# Patient Record
Sex: FEMALE | Race: WHITE | NOT HISPANIC OR LATINO | Employment: PART TIME | URBAN - METROPOLITAN AREA
[De-identification: names, ages, dates, MRNs, and addresses within clinical notes are randomized per-mention and may not be internally consistent; named-entity substitution may affect disease eponyms.]

---
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
## 2024-05-01 ENCOUNTER — HOSPITAL ENCOUNTER (EMERGENCY)
Facility: CLINIC | Age: 19
Discharge: HOME | End: 2024-05-02
Attending: EMERGENCY MEDICINE | Admitting: EMERGENCY MEDICINE
Payer: COMMERCIAL

## 2024-05-01 VITALS
BODY MASS INDEX: 20.77 KG/M2 | HEIGHT: 61 IN | OXYGEN SATURATION: 96 % | TEMPERATURE: 98 F | RESPIRATION RATE: 16 BRPM | HEART RATE: 88 BPM | DIASTOLIC BLOOD PRESSURE: 50 MMHG | WEIGHT: 110 LBS | SYSTOLIC BLOOD PRESSURE: 115 MMHG

## 2024-05-01 DIAGNOSIS — S61.216A LACERATION OF RIGHT LITTLE FINGER WITHOUT FOREIGN BODY WITHOUT DAMAGE TO NAIL, INITIAL ENCOUNTER: ICD-10-CM

## 2024-05-01 PROCEDURE — 99284 EMERGENCY DEPT VISIT MOD MDM: CPT

## 2024-05-01 PROCEDURE — 12001 RPR S/N/AX/GEN/TRNK 2.5CM/<: CPT

## 2024-05-01 ASSESSMENT — COLUMBIA-SUICIDE SEVERITY RATING SCALE - C-SSRS
6. HAVE YOU EVER DONE ANYTHING, STARTED TO DO ANYTHING, OR PREPARED TO DO ANYTHING TO END YOUR LIFE?: NO
2. HAVE YOU ACTUALLY HAD ANY THOUGHTS OF KILLING YOURSELF IN THE PAST MONTH?: NO
1. IN THE PAST MONTH, HAVE YOU WISHED YOU WERE DEAD OR WISHED YOU COULD GO TO SLEEP AND NOT WAKE UP?: NO

## 2024-05-01 ASSESSMENT — ACTIVITIES OF DAILY LIVING (ADL): ADLS_ACUITY_SCORE: 33

## 2024-05-02 PROCEDURE — 250N000009 HC RX 250: Performed by: EMERGENCY MEDICINE

## 2024-05-02 RX ADMIN — Medication 3 ML: at 01:06

## 2024-05-02 ASSESSMENT — ACTIVITIES OF DAILY LIVING (ADL): ADLS_ACUITY_SCORE: 33

## 2024-05-02 NOTE — ED TRIAGE NOTES
Patient states cut finger on a soup can at work.  Laceration to right pinky finger.      Triage Assessment (Adult)       Row Name 05/01/24 7740          Triage Assessment    Airway WDL WDL        Respiratory WDL    Respiratory WDL WDL        Cardiac WDL    Cardiac WDL WDL        Peripheral/Neurovascular WDL    Peripheral Neurovascular WDL WDL        Cognitive/Neuro/Behavioral WDL    Cognitive/Neuro/Behavioral WDL WDL

## 2024-05-02 NOTE — ED PROVIDER NOTES
"History   Chief Complaint:  Finger laceration    HPI     Nidhi Rain is a 19 year old female who presents for evaluation of a finger laceration that she sustained at work at [employer] to her right small finger just prior to arrival.  She is right-hand dominant.  The cut occurred on a soup can.  No other injuries.  Bleeding controlled prior to arrival.  Patient volunteers a strong phobia of needles and would prefer that the wound to be glued    Review of External Notes: I personally reviewed prior records, patient was seen in clinic recently to receive testosterone for gender dysphoria.    Medications:    No daily medications    Past Medical History:    Gender dysphoria    Physical Exam   Patient Vitals for the past 24 hrs:   BP Temp Temp src Pulse Resp SpO2 Height Weight   05/01/24 2225 115/50 98  F (36.7  C) Temporal 88 16 96 % 1.549 m (5' 1\") 49.9 kg (110 lb)      Physical Exam  General: Nontoxic-appearing, sitting upright in room 15  CV: rate as above, no active bleeding, R small finger well perfused  Resp: normal respiratory effort  MSK: no surrounding bony tenderness, good ROM all joints in R small finger  Skin: 1.0 cm linear laceration to fingerpad of R small finger, no visible bone or tendon or foreign body  Neuro: alert, surrounding sensation and motor function intact  Psych: cooperative, slightly anxious    Emergency Department Course   Procedures:   Procedure Note: Laceration Repair   Performed by: Jairon Benjamin MD    Verbal consent given by  patient who confirms understanding of the procedure being performed after discussing the risks, benefits and alternatives.    Preparation: Patient was prepped and draped in usual sterile fashion, with assistance from ERT.  Irrigation solution: saline    Body area: R small finger  Laceration length: 1.0 cm  Contamination: The wound is not grossly contaminated.  Foreign bodies: none  Tendon involvement: none  Anesthetic: LET    Debridement: none "   Skin closure: Medical glue  Approximation: close  Approximation difficulty: simple    Patient tolerated the procedure well with no immediate complications.      Emergency Department Course:  Reviewed:  I reviewed nursing notes, vitals, and past medical history    Assessments/Consultations/Discussion of Management or Tests :  I obtained history and examined the patient as noted above.   ED Course as of 05/02/24 0227   Thu May 02, 2024   5631 I rechecked patient, performed wound care.       Interventions:  Medications   lido-EPINEPHrine-tetracaine (LET) topical gel GEL (3 mLs Topical $Given 5/2/24 0106)        Social Determinants of Health affecting care:   Healthcare Access/Compliance and Employment/Unemployment    Disposition:  Discharged    Impression & Plan    Medical Decision Making:  Patient presents with isolated laceration to the finger pad of the right small finger.  No evidence of retained foreign body nor bony injury or neurovascular compromise so diagnostic workup was deferred.  Patient expressed a strong preference to avoid digital block or sutures, given the small nature of this well-approximated wound I did feel that glue was reasonable, this was performed as above.  I did explain to the patient that glue will be at slightly higher risk of dehiscence, and AlumaFoam splint was therefore placed by the tech to provide some soft tissue protection.  Return precautions and follow-up advice given in the unexpected event of infection or other acute troubles.  Patient expressed understanding of and satisfaction with this plan of care and was discharged in improved condition.  This was a workplace related injury.    Diagnosis:    ICD-10-CM    1. Laceration of right little finger without foreign body without damage to nail, initial encounter  S61.216A          5/2/2024   MD Cassidy Muir, Jairon Tan MD  05/02/24 7970